# Patient Record
(demographics unavailable — no encounter records)

---

## 2024-11-25 NOTE — END OF VISIT
[FreeTextEntry3] :     Saw and examined patient; the above is an accurate documentation of my words and actions.   Audelia Tompkins MD [facility] Pediatric Orthopedic Surgery    [Time Spent: ___ minutes] : I have spent [unfilled] minutes of time on the encounter which excludes teaching and separately reported services.

## 2024-11-25 NOTE — DATA REVIEWED
[de-identified] : XR scoliosis AP and lateral performed today 11/25/24: There is significant correction of the scoliosis deformity noted. Risser 1  Scoliosis xrays AP and lateral 8/22/24: 20 degree curve from T11-L3 c/w adolescent idiopathic scoliosis. Risser 1.  Scoliosis x-rays PA and lateral were done today.  There is 16.75 degree scoliosis from T12-L4.  The disc heights are maintained.  Sagittal alignment is maintained.  Coronal balance is maintained.  There no vertebral abnormalities that were noticed.Risser zero. Quintana 3

## 2024-11-25 NOTE — PHYSICAL EXAM
[FreeTextEntry1] : General: Patient is awake and alert and in no acute distress. oriented to person, place, and time. well developed, well nourished, cooperative.   Skin: The skin is intact, warm, pink, and dry over the area examined.   Eyes: normal conjunctiva, normal eyelids and pupils were equal and round.   ENT: normal ears, normal nose and normal lips.  Cardiovascular: There is brisk capillary refill in the digits of the affected extremity. They are symmetric pulses in the bilateral upper and lower extremities, positive peripheral pulses, brisk capillary refill, but no peripheral edema.  Respiratory: The patient is in no apparent respiratory distress. They're taking full deep breaths without use of accessory muscles or evidence of audible wheezes or stridor without the use of a stethoscope, normal respiratory effort.   Musculoskeletal:.Examination of both the upper and lower extremities did not show any obvious abnormality. There is no gross deformity. Patient has full range of motion of both the hips, knees, ankles, wrists, elbows, and shoulders. Neck range of motion is full and free without any pain or spasm.   Examination of the back reveals shoulder asymmetry. The pelvis is symmetric. On Asa's forward bend right thoracolumbar prominence noted. Mild truncal shift noted. Scapulas symmetric. Patient is able to bend forward and touch the toes as well bend backwards without pain. Lateral flexion is symmetrical and is pain free. Straight leg raising test is free to more than 70 degrees.   Neurological examination reveals a grade 5/5 muscle power. Sensation is intact to crude touch and pinprick. Deep tendon reflexes are 1+ with ankle jerk and knee jerk. The plantars are bilaterally down going. Superficial abdominal reflexes are symmetric and intact. The biceps and triceps reflexes are 1+.    There is no hairy patch, lipoma, sinus in the back. There is no pes cavus, asymmetry of calves, significant leg length discrepancy or significant cafe-au-lait spots.

## 2024-11-25 NOTE — ASSESSMENT
[FreeTextEntry1] : Lucita is a 11Y post-menarchal female with a 20 degree adolescent idiopathic scoliosis curve, Risser 1 being managed in a TLSO brace  Today's visit included obtaining history from the parent due to the child's age, the child could not be considered a reliable historian, requiring parent to act as independent historian  Clinical imaging and exam were reviewed with patient and father at length. Scoliosis x-rays AP and lateral done today IN brace show significant correction of the deformity. Patient is Risser 1. There is normal kyphosis and lordosis appreciated on lateral films. Natural history of scoliosis discussed in detail with patient and father. Discussed that since patient has a significant amount of growth of the spine left, it is possible for the curve to progress further. For now, we will continue to monitor the patient's curve for the next few years. I am recommending daily back and core strengthening exercises.  No activity limitations. The father understands that the braces do not correct curves permanently and that there is 30% risk brace failure. Parents understand the risk of curve progression needing surgery. Surgery is usually recommended for curves 40-45 degrees or more. I am recommending follow up in 3 months. Scoliosis PA x-rays will be done OUT of the brace. She is to take a 24-48 hour brace holiday prior to her visit. All questions answered. Family and patient verbalize understanding of the plan.   IJanine PA-C have acted as scribe and documented the above for Dr. Tompkins

## 2024-11-25 NOTE — HISTORY OF PRESENT ILLNESS
[FreeTextEntry1] : Lucita is a 12Y postmenarchal female who presents with her father for follow up of scoliosis. She had her FMP March 2024. Father previously stated that on routine exam with pediatrician, an asymmetry of the spine was noted. She is otherwise in her usual state of health and denies any current back pain, radiating pain or any numbness or weakness. She has no bowel or bladder dysfunction. She is able to participate in all of her normal activities. There is a family history for scoliosis with her maternal grandmother having had scoliosis. Last visit, her scoliosis progressed to 20 degrees. She was recommended TLSO brace. The brace was delivered 2 weeks ago. She has been wearing the brace 10 hours/day. Denies any brace issues. Here for further  orthopedic evaluation and management.  The patient's HPI was reviewed thoroughly with patient and parent. The patient's parent has acted as an independent historian regarding the above information due to the unreliable nature of the history obtained from the patient.